# Patient Record
Sex: FEMALE | Race: WHITE | URBAN - METROPOLITAN AREA
[De-identification: names, ages, dates, MRNs, and addresses within clinical notes are randomized per-mention and may not be internally consistent; named-entity substitution may affect disease eponyms.]

---

## 2023-03-06 ENCOUNTER — ON CAMPUS - OUTPATIENT (OUTPATIENT)
Dept: URBAN - METROPOLITAN AREA HOSPITAL 16 | Facility: HOSPITAL | Age: 83
End: 2023-03-06
Payer: COMMERCIAL

## 2023-03-06 DIAGNOSIS — K92.1 MELENA: ICD-10-CM

## 2023-03-06 DIAGNOSIS — D62 ACUTE POSTHEMORRHAGIC ANEMIA: ICD-10-CM

## 2023-03-06 DIAGNOSIS — K92.0 HEMATEMESIS: ICD-10-CM

## 2023-03-06 PROCEDURE — 99204 OFFICE O/P NEW MOD 45 MIN: CPT | Performed by: NURSE PRACTITIONER

## 2023-03-07 ENCOUNTER — ON CAMPUS - OUTPATIENT (OUTPATIENT)
Dept: URBAN - METROPOLITAN AREA HOSPITAL 16 | Facility: HOSPITAL | Age: 83
End: 2023-03-07
Payer: COMMERCIAL

## 2023-03-07 DIAGNOSIS — K27.9 PEPTIC ULCER, SITE UNSPECIFIED, UNSPECIFIED AS ACUTE OR CHRO: ICD-10-CM

## 2023-03-07 DIAGNOSIS — D62 ACUTE POSTHEMORRHAGIC ANEMIA: ICD-10-CM

## 2023-03-07 DIAGNOSIS — K92.0 HEMATEMESIS: ICD-10-CM

## 2023-03-07 DIAGNOSIS — K29.70 GASTRITIS, UNSPECIFIED, WITHOUT BLEEDING: ICD-10-CM

## 2023-03-07 PROCEDURE — 43239 EGD BIOPSY SINGLE/MULTIPLE: CPT | Performed by: INTERNAL MEDICINE

## 2023-03-29 ENCOUNTER — OFFICE (OUTPATIENT)
Dept: URBAN - METROPOLITAN AREA CLINIC 79 | Facility: CLINIC | Age: 83
End: 2023-03-29
Payer: COMMERCIAL

## 2023-03-29 VITALS
DIASTOLIC BLOOD PRESSURE: 73 MMHG | WEIGHT: 133 LBS | HEART RATE: 86 BPM | HEIGHT: 64 IN | TEMPERATURE: 98.1 F | SYSTOLIC BLOOD PRESSURE: 127 MMHG

## 2023-03-29 DIAGNOSIS — Z95.3 PRESENCE OF XENOGENIC HEART VALVE: ICD-10-CM

## 2023-03-29 DIAGNOSIS — Z79.1 LONG TERM (CURRENT) USE OF NON-STEROIDAL ANTI-INFLAMMATORIES: ICD-10-CM

## 2023-03-29 DIAGNOSIS — Z95.0 PRESENCE OF CARDIAC PACEMAKER: ICD-10-CM

## 2023-03-29 DIAGNOSIS — K25.0 ACUTE GASTRIC ULCER WITH HEMORRHAGE: ICD-10-CM

## 2023-03-29 DIAGNOSIS — D62 ACUTE POSTHEMORRHAGIC ANEMIA: ICD-10-CM

## 2023-03-29 PROCEDURE — 99214 OFFICE O/P EST MOD 30 MIN: CPT | Performed by: NURSE PRACTITIONER

## 2023-03-29 RX ORDER — PANTOPRAZOLE 40 MG/1
TABLET, DELAYED RELEASE ORAL
Qty: 60 | Refills: 1 | Status: COMPLETED
End: 2023-08-31

## 2023-03-29 NOTE — SERVICEHPINOTES
Mrs. Rosales is an 82-year-old female with a PMH significant for breast cancer S/P left radical mastectomy, impaired left diaphragm, pacemaker and H/O AVR with porcine valve who presents the clinic today after a recent hospitalization for upper G.I. bleed secondary to a gastric ulcer and acute blood loss anemia.Mrs. Rosales broke her arm in 12/2022. She began taking 1-3 Advil on a daily basis in addition to her daily baby aspirin. On March 6, 2023 she had one episode of hematemesis and one large episode of black stool and presented to the emergency room. There hemoglobin was 11.6, lower than her previous hemoglobin of 13.6. She had a CT Angio of the abdomen and pelvis which showed diffuse gastric wall thickening. During her encounter she had an endoscopy on March 7, which showed a normal esophagus, non-bleeding gastric ulcer with a clean base, gastritis and a normal duodenal bulb. Biopsies negative for h-pylori. She was placed on pantoprazole 40 mg b.i.d. and ultimately discharged home.brSince discharge, she has been doing well from a GI standpoint. She denies abdominal pain, nausea, vomiting, hematemesis or melena. She has stopped taking NSAIDs. She has been taking her pantoprazole 40 mg b.i.d. as instructed. She had lab work yesterday and reports her hbg was 11. brCardiologist is Dr Wilson in [facility], brShe is currently very stressed as she is taking care of her  with vascular dementia.

## 2023-06-15 ENCOUNTER — ON CAMPUS - OUTPATIENT (OUTPATIENT)
Dept: URBAN - METROPOLITAN AREA HOSPITAL 16 | Facility: HOSPITAL | Age: 83
End: 2023-06-15
Payer: COMMERCIAL

## 2023-06-15 DIAGNOSIS — K29.60 OTHER GASTRITIS WITHOUT BLEEDING: ICD-10-CM

## 2023-06-15 DIAGNOSIS — K25.9 GASTRIC ULCER, UNSPECIFIED AS ACUTE OR CHRONIC, WITHOUT HEMO: ICD-10-CM

## 2023-06-15 PROCEDURE — 43239 EGD BIOPSY SINGLE/MULTIPLE: CPT | Performed by: INTERNAL MEDICINE

## 2023-08-31 ENCOUNTER — OFFICE (OUTPATIENT)
Dept: URBAN - METROPOLITAN AREA CLINIC 34 | Facility: CLINIC | Age: 83
End: 2023-08-31
Payer: COMMERCIAL

## 2023-08-31 VITALS
TEMPERATURE: 98.2 F | HEART RATE: 81 BPM | DIASTOLIC BLOOD PRESSURE: 58 MMHG | WEIGHT: 122 LBS | SYSTOLIC BLOOD PRESSURE: 117 MMHG | HEIGHT: 64 IN

## 2023-08-31 DIAGNOSIS — D62 ACUTE POSTHEMORRHAGIC ANEMIA: ICD-10-CM

## 2023-08-31 DIAGNOSIS — K25.0 ACUTE GASTRIC ULCER WITH HEMORRHAGE: ICD-10-CM

## 2023-08-31 PROCEDURE — 99213 OFFICE O/P EST LOW 20 MIN: CPT | Performed by: INTERNAL MEDICINE

## 2023-08-31 RX ORDER — PANTOPRAZOLE 20 MG/1
20 TABLET, DELAYED RELEASE ORAL
Qty: 90 | Refills: 4 | Status: ACTIVE

## 2023-08-31 NOTE — SERVICEHPINOTES
THUY CADE   is a   83  female who presents for follow up. She has been doing well since last EGD which showed healing ulcer. She tapered down to PPI once daily. She can get occasional nausea with no specific triggers. This has been going on intermittently for a few years. She can drink water which resolves symptoms. She denies any abdominal pain, vomiting, diarrhea, constipation, melena, rectal bleeding, loss of appetite, weight loss. No fatigue or weakness.

## 2024-04-24 ENCOUNTER — NEW PATIENT (OUTPATIENT)
Dept: URBAN - METROPOLITAN AREA CLINIC 80 | Facility: CLINIC | Age: 84
End: 2024-04-24

## 2024-04-24 DIAGNOSIS — Z96.1: ICD-10-CM

## 2024-04-24 DIAGNOSIS — H43.813: ICD-10-CM

## 2024-04-24 DIAGNOSIS — H35.3132: ICD-10-CM

## 2024-04-24 DIAGNOSIS — B00.52: ICD-10-CM

## 2024-04-24 DIAGNOSIS — H26.493: ICD-10-CM

## 2024-04-24 PROCEDURE — 92134 CPTRZ OPH DX IMG PST SGM RTA: CPT

## 2024-04-24 PROCEDURE — 92201 OPSCPY EXTND RTA DRAW UNI/BI: CPT

## 2024-04-24 PROCEDURE — 99204 OFFICE O/P NEW MOD 45 MIN: CPT

## 2024-04-24 ASSESSMENT — VISUAL ACUITY
OS_SC: 20/20
OD_PH: 20/30+2
OD_SC: 20/60-2

## 2024-04-24 ASSESSMENT — TONOMETRY
OS_IOP_MMHG: 13
OD_IOP_MMHG: 13

## 2024-06-28 ENCOUNTER — OFFICE (OUTPATIENT)
Dept: URBAN - METROPOLITAN AREA CLINIC 102 | Facility: CLINIC | Age: 84
End: 2024-06-28
Payer: MEDICARE

## 2024-06-28 VITALS
TEMPERATURE: 97.5 F | HEART RATE: 78 BPM | HEIGHT: 64 IN | WEIGHT: 130 LBS | DIASTOLIC BLOOD PRESSURE: 54 MMHG | SYSTOLIC BLOOD PRESSURE: 104 MMHG

## 2024-06-28 DIAGNOSIS — Z95.0 PRESENCE OF CARDIAC PACEMAKER: ICD-10-CM

## 2024-06-28 DIAGNOSIS — R17 UNSPECIFIED JAUNDICE: ICD-10-CM

## 2024-06-28 DIAGNOSIS — I50.9 HEART FAILURE, UNSPECIFIED: ICD-10-CM

## 2024-06-28 DIAGNOSIS — D50.9 IRON DEFICIENCY ANEMIA, UNSPECIFIED: ICD-10-CM

## 2024-06-28 PROCEDURE — 99215 OFFICE O/P EST HI 40 MIN: CPT | Performed by: NURSE PRACTITIONER

## 2024-06-28 NOTE — SERVICEHPINOTES
THUY CADE   is a   84   year old    female who is being seen in consultation at the request of   ETELVINA PEÑALOZA   with of PMH Anemia, breast cancer, gastric ulcer, heart murmur, heart valve disease HLD, pacemaker, who saw PCP for sob, swelling in legs and arm. Per patient hemoglobin and iron was low.  Dr. Peñaloza believes patient might have a "slow bleed."  Has gotten weaker and per daughter has rapidly declined in 1 week. br
br Denies blood in stool, hematemesis, abdominal pain, vomiting. br + n
br
br Still talking pantoprazole 20 mg daily. br
br Had a bleeding ulcer in the past. brBlood work from  PCP hemoglobin 8.7 (L) hematocrit 30.2 (L), MCV 75.1 (L) brBNP 573 (H)br
br
br Has a colonoscopy 3 years ago- at [facility] br
brhas diverticulitis and had part of colon removed. br
br Last EGD June 2023- bleb in esophagus, +inflammation in the stomach, likely related to food/drink ingested. no h pylori infection. continue PPI twice a day for 2 more months and then once daily.
br
brSees pulmonologist-Dr. Kimble
br
br Sees cardiologist- Dr. Oneal and per pt saw him recently 2 weeks ago.

## 2024-07-16 ENCOUNTER — OFFICE (OUTPATIENT)
Dept: URBAN - METROPOLITAN AREA CLINIC 102 | Facility: CLINIC | Age: 84
End: 2024-07-16
Payer: MEDICARE

## 2024-07-16 VITALS
TEMPERATURE: 97.9 F | SYSTOLIC BLOOD PRESSURE: 108 MMHG | HEART RATE: 80 BPM | DIASTOLIC BLOOD PRESSURE: 59 MMHG | HEIGHT: 64 IN | WEIGHT: 127 LBS

## 2024-07-16 DIAGNOSIS — I50.9 HEART FAILURE, UNSPECIFIED: ICD-10-CM

## 2024-07-16 DIAGNOSIS — Z95.3 PRESENCE OF XENOGENIC HEART VALVE: ICD-10-CM

## 2024-07-16 DIAGNOSIS — Z95.0 PRESENCE OF CARDIAC PACEMAKER: ICD-10-CM

## 2024-07-16 DIAGNOSIS — R17 UNSPECIFIED JAUNDICE: ICD-10-CM

## 2024-07-16 DIAGNOSIS — D50.9 IRON DEFICIENCY ANEMIA, UNSPECIFIED: ICD-10-CM

## 2024-07-16 LAB
HAPTOGLOBIN: 134 MG/DL (ref 41–333)
LDH: 219 IU/L (ref 119–226)
RETICULOCYTE COUNT: 1.8 % (ref 0.6–2.6)

## 2024-07-16 PROCEDURE — 99214 OFFICE O/P EST MOD 30 MIN: CPT | Performed by: INTERNAL MEDICINE

## 2024-07-16 NOTE — SERVICEHPINOTES
THUY CADE   is a   84  female who complains of shortness of breath. She was seen last month with complaints of dyspnea on exertion likely due to multiple etiologies: CHF, valvular disease, elevated left diaphragm with limited expansion of the left lung, and anemia. She was scheduled for EGD and colonoscopy but cardiology was not comfortable clearing this patient unless procedure was emergent. She had EGD in 2023 with ulcer. Follow up EGD revealed ulcer had healed. Her last colonoscopy was a few years ago at [facility]. She has no abdominal pain, nausea, vomiting, diarrhea, constipation, rectal bleeding, loss of appetite. She was on iron supplements for only a short time as she stopped it prior to scheduled procedures. She does note occasional dark stools but nothing black and tarry. 
br
br 
Of note, she states that during last EGD, BP dropped. br